# Patient Record
Sex: FEMALE | Race: WHITE | NOT HISPANIC OR LATINO | URBAN - METROPOLITAN AREA
[De-identification: names, ages, dates, MRNs, and addresses within clinical notes are randomized per-mention and may not be internally consistent; named-entity substitution may affect disease eponyms.]

---

## 2018-05-27 ENCOUNTER — EMERGENCY (EMERGENCY)
Facility: HOSPITAL | Age: 53
LOS: 0 days | Discharge: HOME | End: 2018-05-28
Payer: COMMERCIAL

## 2018-05-27 PROCEDURE — 74177 CT ABD & PELVIS W/CONTRAST: CPT | Mod: 26

## 2018-05-27 PROCEDURE — 99285 EMERGENCY DEPT VISIT HI MDM: CPT

## 2018-05-28 PROCEDURE — 74018 RADEX ABDOMEN 1 VIEW: CPT | Mod: 26

## 2018-08-10 ENCOUNTER — APPOINTMENT (OUTPATIENT)
Dept: OBGYN | Facility: CLINIC | Age: 53
End: 2018-08-10
Payer: COMMERCIAL

## 2018-08-10 VITALS
SYSTOLIC BLOOD PRESSURE: 128 MMHG | DIASTOLIC BLOOD PRESSURE: 64 MMHG | BODY MASS INDEX: 36.32 KG/M2 | WEIGHT: 226 LBS | HEIGHT: 66 IN

## 2018-08-10 DIAGNOSIS — Z00.00 ENCOUNTER FOR GENERAL ADULT MEDICAL EXAMINATION W/OUT ABNORMAL FINDINGS: ICD-10-CM

## 2018-08-10 PROCEDURE — 99396 PREV VISIT EST AGE 40-64: CPT

## 2018-08-13 LAB — HPV HIGH+LOW RISK DNA PNL CVX: NOT DETECTED

## 2018-08-15 LAB — CYTOLOGY CVX/VAG DOC THIN PREP: NORMAL

## 2018-08-21 ENCOUNTER — OUTPATIENT (OUTPATIENT)
Dept: OUTPATIENT SERVICES | Facility: HOSPITAL | Age: 53
LOS: 1 days | End: 2018-08-21

## 2018-09-04 ENCOUNTER — OTHER (OUTPATIENT)
Age: 53
End: 2018-09-04

## 2018-10-05 DIAGNOSIS — N63.0 UNSPECIFIED LUMP IN UNSPECIFIED BREAST: ICD-10-CM

## 2019-03-05 PROBLEM — N63.0 BREAST LUMP: Status: ACTIVE | Noted: 2019-03-05

## 2019-03-26 ENCOUNTER — RESULT REVIEW (OUTPATIENT)
Age: 54
End: 2019-03-26

## 2019-08-05 ENCOUNTER — TRANSCRIPTION ENCOUNTER (OUTPATIENT)
Age: 54
End: 2019-08-05

## 2019-08-12 ENCOUNTER — APPOINTMENT (OUTPATIENT)
Dept: OBGYN | Facility: CLINIC | Age: 54
End: 2019-08-12

## 2019-11-15 ENCOUNTER — APPOINTMENT (OUTPATIENT)
Dept: OBGYN | Facility: CLINIC | Age: 54
End: 2019-11-15

## 2020-01-10 ENCOUNTER — APPOINTMENT (OUTPATIENT)
Dept: ULTRASOUND IMAGING | Facility: CLINIC | Age: 55
End: 2020-01-10
Payer: COMMERCIAL

## 2020-01-10 ENCOUNTER — APPOINTMENT (OUTPATIENT)
Dept: MAMMOGRAPHY | Facility: CLINIC | Age: 55
End: 2020-01-10

## 2020-01-10 PROCEDURE — 76641 ULTRASOUND BREAST COMPLETE: CPT | Mod: 50

## 2020-01-10 PROCEDURE — 77066 DX MAMMO INCL CAD BI: CPT

## 2020-01-10 PROCEDURE — G0279: CPT

## 2020-01-17 ENCOUNTER — APPOINTMENT (OUTPATIENT)
Dept: OBGYN | Facility: CLINIC | Age: 55
End: 2020-01-17
Payer: COMMERCIAL

## 2020-01-17 VITALS
SYSTOLIC BLOOD PRESSURE: 130 MMHG | BODY MASS INDEX: 37.98 KG/M2 | WEIGHT: 242 LBS | HEIGHT: 67 IN | DIASTOLIC BLOOD PRESSURE: 72 MMHG

## 2020-01-17 DIAGNOSIS — Z01.411 ENCOUNTER FOR GYNECOLOGICAL EXAMINATION (GENERAL) (ROUTINE) WITH ABNORMAL FINDINGS: ICD-10-CM

## 2020-01-17 PROCEDURE — 99396 PREV VISIT EST AGE 40-64: CPT

## 2020-01-17 PROCEDURE — 99213 OFFICE O/P EST LOW 20 MIN: CPT | Mod: 25

## 2020-01-17 NOTE — PHYSICAL EXAM
[Awake] : awake [Alert] : alert [Soft] : soft [Oriented x3] : oriented to person, place, and time [Normal] : vagina [No Bleeding] : there was no active vaginal bleeding [Polyp ___ cm] : had a [unfilled] ~Ucm polyp [Uterine Adnexae] : were not tender and not enlarged [Acute Distress] : no acute distress [Goiter] : no goiter [LAD] : no lymphadenopathy [Thyroid Nodule] : no thyroid nodule [Mass] : no breast mass [Nipple Discharge] : no nipple discharge [Axillary LAD] : no axillary lymphadenopathy [Tender] : non tender [FreeTextEntry7] : unable to palpate gyn organs 2/2 body habitus

## 2020-01-18 ENCOUNTER — TRANSCRIPTION ENCOUNTER (OUTPATIENT)
Age: 55
End: 2020-01-18

## 2020-01-21 LAB — HPV HIGH+LOW RISK DNA PNL CVX: NOT DETECTED

## 2020-01-24 ENCOUNTER — ASOB RESULT (OUTPATIENT)
Age: 55
End: 2020-01-24

## 2020-01-24 ENCOUNTER — APPOINTMENT (OUTPATIENT)
Dept: OBGYN | Facility: CLINIC | Age: 55
End: 2020-01-24
Payer: COMMERCIAL

## 2020-01-24 LAB — CYTOLOGY CVX/VAG DOC THIN PREP: ABNORMAL

## 2020-01-24 PROCEDURE — 76830 TRANSVAGINAL US NON-OB: CPT

## 2020-01-24 PROCEDURE — 76856 US EXAM PELVIC COMPLETE: CPT | Mod: 59

## 2020-01-30 ENCOUNTER — RESULT REVIEW (OUTPATIENT)
Age: 55
End: 2020-01-30

## 2020-03-30 ENCOUNTER — APPOINTMENT (OUTPATIENT)
Dept: OBGYN | Facility: CLINIC | Age: 55
End: 2020-03-30
Payer: COMMERCIAL

## 2020-03-30 VITALS
HEIGHT: 67 IN | WEIGHT: 242 LBS | DIASTOLIC BLOOD PRESSURE: 80 MMHG | BODY MASS INDEX: 37.98 KG/M2 | SYSTOLIC BLOOD PRESSURE: 122 MMHG

## 2020-03-30 DIAGNOSIS — N84.0 POLYP OF CORPUS UTERI: ICD-10-CM

## 2020-03-30 DIAGNOSIS — N84.1 POLYP OF CERVIX UTERI: ICD-10-CM

## 2020-03-30 PROCEDURE — 99213 OFFICE O/P EST LOW 20 MIN: CPT

## 2020-03-31 ENCOUNTER — APPOINTMENT (OUTPATIENT)
Dept: OBGYN | Facility: CLINIC | Age: 55
End: 2020-03-31
Payer: COMMERCIAL

## 2020-03-31 ENCOUNTER — ASOB RESULT (OUTPATIENT)
Age: 55
End: 2020-03-31

## 2020-03-31 VITALS
SYSTOLIC BLOOD PRESSURE: 120 MMHG | HEIGHT: 67 IN | WEIGHT: 242 LBS | BODY MASS INDEX: 37.98 KG/M2 | DIASTOLIC BLOOD PRESSURE: 78 MMHG

## 2020-03-31 PROCEDURE — 58340 CATHETER FOR HYSTEROGRAPHY: CPT

## 2020-03-31 PROCEDURE — 76831 ECHO EXAM UTERUS: CPT

## 2020-03-31 PROCEDURE — 57500 BIOPSY OF CERVIX: CPT | Mod: 59

## 2020-04-04 LAB — CORE LAB BIOPSY: NORMAL

## 2021-01-21 ENCOUNTER — APPOINTMENT (OUTPATIENT)
Dept: OBGYN | Facility: CLINIC | Age: 56
End: 2021-01-21
Payer: COMMERCIAL

## 2021-01-21 VITALS — SYSTOLIC BLOOD PRESSURE: 128 MMHG | HEIGHT: 67 IN | DIASTOLIC BLOOD PRESSURE: 80 MMHG

## 2021-01-21 PROCEDURE — 99396 PREV VISIT EST AGE 40-64: CPT

## 2021-01-21 PROCEDURE — 99072 ADDL SUPL MATRL&STAF TM PHE: CPT

## 2021-01-21 NOTE — PHYSICAL EXAM
[Appropriately responsive] : appropriately responsive [Alert] : alert [No Acute Distress] : no acute distress [No Lymphadenopathy] : no lymphadenopathy [Regular Rate Rhythm] : regular rate rhythm [No Murmurs] : no murmurs [Clear to Auscultation B/L] : clear to auscultation bilaterally [Soft] : soft [Non-tender] : non-tender [Non-distended] : non-distended [No Mass] : no mass [Oriented x3] : oriented x3 [FreeTextEntry6] : no dominant masses   no skin dimpling   no nipple discharge [Examination Of The Breasts] : a normal appearance [No Discharge] : no discharge [No Masses] : no breast masses were palpable [No Lesions] : no lesions  [Labia Majora] : normal [Labia Minora] : normal [Pink Rugae] : pink rugae [No Bleeding] : There was no active vaginal bleeding [Normal] : normal [Normal Position] : in a normal position [Tenderness] : nontender [Declined] : Patient declined rectal exam [Uterine Adnexae] : normal

## 2021-01-22 LAB
C TRACH RRNA SPEC QL NAA+PROBE: NOT DETECTED
N GONORRHOEA RRNA SPEC QL NAA+PROBE: NOT DETECTED
SOURCE TP AMPLIFICATION: NORMAL

## 2021-01-23 LAB — HPV HIGH+LOW RISK DNA PNL CVX: NOT DETECTED

## 2021-01-27 DIAGNOSIS — B96.89 ACUTE VAGINITIS: ICD-10-CM

## 2021-01-27 DIAGNOSIS — N76.0 ACUTE VAGINITIS: ICD-10-CM

## 2021-01-27 LAB — CYTOLOGY CVX/VAG DOC THIN PREP: ABNORMAL

## 2021-01-28 ENCOUNTER — NON-APPOINTMENT (OUTPATIENT)
Age: 56
End: 2021-01-28

## 2021-02-08 ENCOUNTER — OUTPATIENT (OUTPATIENT)
Dept: OUTPATIENT SERVICES | Facility: HOSPITAL | Age: 56
LOS: 1 days | End: 2021-02-08

## 2021-03-08 ENCOUNTER — OUTPATIENT (OUTPATIENT)
Dept: OUTPATIENT SERVICES | Facility: HOSPITAL | Age: 56
LOS: 1 days | End: 2021-03-08

## 2021-04-17 ENCOUNTER — RESULT REVIEW (OUTPATIENT)
Age: 56
End: 2021-04-17

## 2021-04-17 ENCOUNTER — APPOINTMENT (OUTPATIENT)
Dept: MAMMOGRAPHY | Facility: CLINIC | Age: 56
End: 2021-04-17
Payer: COMMERCIAL

## 2021-04-17 PROCEDURE — 77063 BREAST TOMOSYNTHESIS BI: CPT

## 2021-04-17 PROCEDURE — 77067 SCR MAMMO BI INCL CAD: CPT

## 2021-06-29 ENCOUNTER — APPOINTMENT (OUTPATIENT)
Dept: ULTRASOUND IMAGING | Facility: CLINIC | Age: 56
End: 2021-06-29
Payer: COMMERCIAL

## 2021-06-29 ENCOUNTER — APPOINTMENT (OUTPATIENT)
Dept: RADIOLOGY | Facility: CLINIC | Age: 56
End: 2021-06-29
Payer: COMMERCIAL

## 2021-06-29 PROCEDURE — 76536 US EXAM OF HEAD AND NECK: CPT

## 2021-06-29 PROCEDURE — 72114 X-RAY EXAM L-S SPINE BENDING: CPT

## 2022-02-24 ENCOUNTER — OUTPATIENT (OUTPATIENT)
Dept: OUTPATIENT SERVICES | Facility: HOSPITAL | Age: 57
LOS: 1 days | End: 2022-02-24

## 2022-02-24 DIAGNOSIS — E03.9 HYPOTHYROIDISM, UNSPECIFIED: ICD-10-CM

## 2022-02-24 DIAGNOSIS — Z83.3 FAMILY HISTORY OF DIABETES MELLITUS: ICD-10-CM

## 2022-02-24 DIAGNOSIS — R31.9 HEMATURIA, UNSPECIFIED: ICD-10-CM

## 2022-03-05 ENCOUNTER — APPOINTMENT (OUTPATIENT)
Dept: ULTRASOUND IMAGING | Facility: CLINIC | Age: 57
End: 2022-03-05
Payer: COMMERCIAL

## 2022-03-05 PROCEDURE — 76770 US EXAM ABDO BACK WALL COMP: CPT

## 2022-03-14 ENCOUNTER — RESULT CHARGE (OUTPATIENT)
Age: 57
End: 2022-03-14

## 2022-03-14 ENCOUNTER — APPOINTMENT (OUTPATIENT)
Dept: UROGYNECOLOGY | Facility: CLINIC | Age: 57
End: 2022-03-14
Payer: COMMERCIAL

## 2022-03-14 VITALS
SYSTOLIC BLOOD PRESSURE: 132 MMHG | HEIGHT: 67 IN | BODY MASS INDEX: 24.33 KG/M2 | WEIGHT: 155 LBS | DIASTOLIC BLOOD PRESSURE: 78 MMHG

## 2022-03-14 DIAGNOSIS — Z87.19 PERSONAL HISTORY OF OTHER DISEASES OF THE DIGESTIVE SYSTEM: ICD-10-CM

## 2022-03-14 DIAGNOSIS — N39.46 MIXED INCONTINENCE: ICD-10-CM

## 2022-03-14 DIAGNOSIS — N32.81 OVERACTIVE BLADDER: ICD-10-CM

## 2022-03-14 DIAGNOSIS — Z86.39 PERSONAL HISTORY OF OTHER ENDOCRINE, NUTRITIONAL AND METABOLIC DISEASE: ICD-10-CM

## 2022-03-14 DIAGNOSIS — R10.30 LOWER ABDOMINAL PAIN, UNSPECIFIED: ICD-10-CM

## 2022-03-14 DIAGNOSIS — Z87.09 PERSONAL HISTORY OF OTHER DISEASES OF THE RESPIRATORY SYSTEM: ICD-10-CM

## 2022-03-14 DIAGNOSIS — E07.9 DISORDER OF THYROID, UNSPECIFIED: ICD-10-CM

## 2022-03-14 LAB
BILIRUB UR QL STRIP: NEGATIVE
CLARITY UR: CLEAR
COLLECTION METHOD: NORMAL
GLUCOSE UR-MCNC: NEGATIVE
HCG UR QL: 0.2 EU/DL
HGB UR QL STRIP.AUTO: NEGATIVE
KETONES UR-MCNC: NEGATIVE
LEUKOCYTE ESTERASE UR QL STRIP: NORMAL
NITRITE UR QL STRIP: NEGATIVE
PH UR STRIP: 7
PROT UR STRIP-MCNC: NEGATIVE
SP GR UR STRIP: 1.01

## 2022-03-14 PROCEDURE — 99204 OFFICE O/P NEW MOD 45 MIN: CPT

## 2022-03-14 PROCEDURE — 99244 OFF/OP CNSLTJ NEW/EST MOD 40: CPT

## 2022-03-14 NOTE — DISCUSSION/SUMMARY
[FreeTextEntry1] : Patient is a 56 -year-old multipara with class III obesity and nocturia, now with mixed urinary incontinence (urgency greater than stress), and overactive bladder. On examination, there is no evidence of urethral hypermobility with a negative empty bladder supine cough stress test, normal postvoid residual, and fair levator ani awareness/contraction. She has no history of recurrent UTI. She admits to consumption of lfew of bladder irritants. Other potential contributing factors include high BMI, \par \par The patient was counseled regarding the pathophysiology of the above conditions. A total of approximately 60 minutes was spent on this visit, greater than 50%of which was spent on counseling. She was also counseled regarding the risks, benefits, indications, and alternatives of further evaluations studies, as well as various management options. She was given verbal and written information/education on pelvic floor muscle exercises, avoidance of dietary bladder irritants, and other strategies to improve bladder control. She was counseled regarding treatment options for stress urinary incontinence including pelvic floor PT, pessary placement and surgical management with midurethral sling. AUGS interactive tool was used to explain normal anatomy as well as alteration in urethral support associated with stress urinary incontinence. Midurethral sling placement as well as urethral bulking was discussed. Pharmacologic management of overactive bladder and urgency incontinence was discussed. After a detailed discussion, following management plan was outlined:\par 1.8 weeks trial of behavioral modification, bladder retraining . she will start the home exercise program as instructed.\par 2. UA with micro and urine culture was ordered to exclude UTI.\par 3. Night time fluid restriction and other strategies to decrease nocturia were reviewed.\par 4.  urodynamic testing. . \par 5. F/u in 2 months

## 2022-03-14 NOTE — HISTORY OF PRESENT ILLNESS
[FreeTextEntry1] : Patient is a 56 years old P2 (C-sectiion x 2) who is referred by Dr. Jaramillo for evaluation and management of urinary leakage with cough or laugh. Happen 0- once a month on average.\par  Also has to rush to the bathroom sometimes. She is a . \par Voids when she has restroom available even though she didn't have to due to nature of the job\par No nocturia\par Doesn't use protection for bladder but wears a sanitary pull up because "i sweat"\par Drinks 1 cup of coffee in am (8-12 oz), water 60 oz per day. \par Has gained 10 pounds over the last 1 year. Hx of lap band in 2011\par Pulling sensation in groin (left >right). Has chronic back pain\par No hx of dyspareunia but last time "it was weird"/\par LMP: June 2021. Last GYN  exam in Jan 2021, used to see Dr. Persaud\par No hx of diabetes\par Renal ultrasound from 3/5/22 was negative (was ordered for work up of pain)\par

## 2022-03-14 NOTE — PHYSICAL EXAM
[Chaperone Present] : A chaperone was present in the examining room during all aspects of the physical examination [FreeTextEntry1] : General: Not in acute distress, alert and oriented x3.\par Neck: Supple. No lymphadenopathy. \par Skin: Color, texture, and turgor are normal. No acute rashes or lesions\par Abdomen: Soft, nontender, and nondistended. No obvious hepatosplenomegaly. No obvious hernias. A well-healed  skin incision. No tenderness elicited. \par Pelvic Exam: Normal external female genitalia. Saddle sensory exam S2 to S4 is intact. Perineal reflexes not visualized. Pale and mildly atrophic-appearing vaginal epithelium. No vaginal blood or discharge. Cervix without abnormal lesions. Urethra is well supported without prolapse, exudates, or lesions. Cough stress test is negative. Post void residual was checked with I/O cath and was 10 cc of clear urine. Bimanual exam reveals a small uterus in normal positioning. No adnexal masses or tenderness but exam limited by her habitus.. All vaginal compartments are well supported. Levator ani contraction is 2/5. Rectovaginal exam was deferred. \par

## 2022-03-15 LAB
APPEARANCE: CLEAR
BACTERIA: NEGATIVE
BILIRUBIN URINE: NEGATIVE
BLOOD URINE: NEGATIVE
COLOR: YELLOW
GLUCOSE QUALITATIVE U: NEGATIVE
HYALINE CASTS: 0 /LPF
KETONES URINE: NEGATIVE
LEUKOCYTE ESTERASE URINE: NEGATIVE
MICROSCOPIC-UA: NORMAL
NITRITE URINE: POSITIVE
PH URINE: 8
PROTEIN URINE: ABNORMAL
RED BLOOD CELLS URINE: 1 /HPF
SPECIFIC GRAVITY URINE: 1.02
SQUAMOUS EPITHELIAL CELLS: 0 /HPF
URINE COMMENTS: NORMAL
UROBILINOGEN URINE: NORMAL
WHITE BLOOD CELLS URINE: 1 /HPF

## 2022-03-16 LAB — BACTERIA UR CULT: NORMAL

## 2022-04-01 ENCOUNTER — NON-APPOINTMENT (OUTPATIENT)
Age: 57
End: 2022-04-01

## 2022-04-01 ENCOUNTER — APPOINTMENT (OUTPATIENT)
Dept: CARDIOLOGY | Facility: CLINIC | Age: 57
End: 2022-04-01
Payer: COMMERCIAL

## 2022-04-01 VITALS
DIASTOLIC BLOOD PRESSURE: 70 MMHG | TEMPERATURE: 96.9 F | BODY MASS INDEX: 39.24 KG/M2 | OXYGEN SATURATION: 98 % | HEIGHT: 67 IN | WEIGHT: 250 LBS | HEART RATE: 68 BPM | SYSTOLIC BLOOD PRESSURE: 120 MMHG

## 2022-04-01 VITALS — SYSTOLIC BLOOD PRESSURE: 124 MMHG | DIASTOLIC BLOOD PRESSURE: 78 MMHG

## 2022-04-01 DIAGNOSIS — Z82.49 FAMILY HISTORY OF ISCHEMIC HEART DISEASE AND OTHER DISEASES OF THE CIRCULATORY SYSTEM: ICD-10-CM

## 2022-04-01 DIAGNOSIS — Z83.3 FAMILY HISTORY OF DIABETES MELLITUS: ICD-10-CM

## 2022-04-01 PROCEDURE — 99204 OFFICE O/P NEW MOD 45 MIN: CPT

## 2022-04-01 RX ORDER — METRONIDAZOLE 7.5 MG/G
0.75 GEL VAGINAL
Qty: 70 | Refills: 1 | Status: DISCONTINUED | COMMUNITY
Start: 2021-01-27 | End: 2022-04-01

## 2022-04-01 NOTE — REASON FOR VISIT
[FreeTextEntry1] : Minnie is a 56-year-old female with history of hypothyroidism, overweight, prediabetes, labile blood pressures, obstructive sleep apnea and Conklin's esophagitis.\par \par She is very active and does not have exertional chest pressure symptoms but she does have occasional epigastric discomfort which is difficult to differentiate from GERD symptoms.  She does not have PND, orthopnea, pedal edema, TIA, syncope\par \par She has sleep apnea and uses CPAP machine which is helping her significantly.\par \par Hyperlipidemia was noted on recent labs.  February 2022, fasting LDL was 176.  She also has prediabetes with A1c of 5.9

## 2022-04-01 NOTE — DISCUSSION/SUMMARY
[FreeTextEntry1] : Patient with several coronary risk factors and atypical epigastric discomfort.  CT calcium score and ETT should be performed.  Patient was scheduled for this tests.\par \par Hypercholesteremia.  She has been started on 5 mg of rosuvastatin.  Target LDL less than 100.  Follow-up lipid labs are pending.  Depending upon that LDL, the dose of rosuvastatin may have to be increased.  Strict dietary changes were also discussed with reduction of saturated fat intake\par \par Overweight: Exercise program and dietary changes were both recommended the.\par \par Current risk factors including family history, prediabetes, overweight, hypercholesterolemia, labile blood pressures: Patient should have ETT to provide useful information regarding blood pressure response as well as exercise capacity besides ruling out CAD\par \par Sleep apnea: Continue CPAP machine\par \par Prediabetes: Dietary changes, weight loss and exercise was again recommended.  Target A1c less than 5.7\par \par Follow-up after above tests has been scheduled\par \par Thank you for this referral and allowing me to participate in the care of this patient.  If I can be of any further help or  if you have any questions, please do not hesitate to contact me\par \par \par Sincerely,\par \par Willian Cowan MD, FACC, MILLICENT

## 2022-04-01 NOTE — PHYSICAL EXAM
[Well Developed] : well developed [Well Nourished] : well nourished [No Acute Distress] : no acute distress [Normal Conjunctiva] : normal conjunctiva [Normal Venous Pressure] : normal venous pressure [No Carotid Bruit] : no carotid bruit [Normal S1, S2] : normal S1, S2 [No Murmur] : no murmur [No Rub] : no rub [No Gallop] : no gallop [Clear Lung Fields] : clear lung fields [Good Air Entry] : good air entry [No Respiratory Distress] : no respiratory distress  [Soft] : abdomen soft [Normal Gait] : normal gait [No Edema] : no edema [No Cyanosis] : no cyanosis [No Clubbing] : no clubbing [No Varicosities] : no varicosities [No Rash] : no rash [No Skin Lesions] : no skin lesions [Moves all extremities] : moves all extremities [No Focal Deficits] : no focal deficits [Normal Speech] : normal speech [Alert and Oriented] : alert and oriented [Normal memory] : normal memory [de-identified] : Obesity

## 2022-04-26 ENCOUNTER — APPOINTMENT (OUTPATIENT)
Dept: CARDIOLOGY | Facility: CLINIC | Age: 57
End: 2022-04-26
Payer: COMMERCIAL

## 2022-04-26 PROCEDURE — 93306 TTE W/DOPPLER COMPLETE: CPT

## 2022-05-07 ENCOUNTER — APPOINTMENT (OUTPATIENT)
Dept: MAMMOGRAPHY | Facility: CLINIC | Age: 57
End: 2022-05-07
Payer: COMMERCIAL

## 2022-05-07 PROCEDURE — 77067 SCR MAMMO BI INCL CAD: CPT

## 2022-05-07 PROCEDURE — 77063 BREAST TOMOSYNTHESIS BI: CPT

## 2022-05-09 ENCOUNTER — APPOINTMENT (OUTPATIENT)
Dept: CARDIOLOGY | Facility: CLINIC | Age: 57
End: 2022-05-09
Payer: COMMERCIAL

## 2022-05-09 PROCEDURE — 93015 CV STRESS TEST SUPVJ I&R: CPT

## 2022-05-16 ENCOUNTER — APPOINTMENT (OUTPATIENT)
Dept: CARDIOLOGY | Facility: CLINIC | Age: 57
End: 2022-05-16

## 2022-06-01 ENCOUNTER — APPOINTMENT (OUTPATIENT)
Dept: CARDIOLOGY | Facility: CLINIC | Age: 57
End: 2022-06-01
Payer: COMMERCIAL

## 2022-06-01 PROCEDURE — 93351 STRESS TTE COMPLETE: CPT

## 2022-06-01 PROCEDURE — 93320 DOPPLER ECHO COMPLETE: CPT

## 2022-06-07 ENCOUNTER — APPOINTMENT (OUTPATIENT)
Dept: CARDIOLOGY | Facility: CLINIC | Age: 57
End: 2022-06-07
Payer: COMMERCIAL

## 2022-06-07 VITALS
OXYGEN SATURATION: 98 % | SYSTOLIC BLOOD PRESSURE: 116 MMHG | TEMPERATURE: 96.8 F | HEART RATE: 63 BPM | DIASTOLIC BLOOD PRESSURE: 68 MMHG | RESPIRATION RATE: 12 BRPM | WEIGHT: 248 LBS | BODY MASS INDEX: 38.92 KG/M2 | HEIGHT: 67 IN

## 2022-06-07 DIAGNOSIS — R03.0 ELEVATED BLOOD-PRESSURE READING, W/OUT DIAGNOSIS OF HYPERTENSION: ICD-10-CM

## 2022-06-07 PROCEDURE — 99213 OFFICE O/P EST LOW 20 MIN: CPT

## 2022-06-07 RX ORDER — LANSOPRAZOLE 15 MG/1
15 CAPSULE, DELAYED RELEASE ORAL DAILY
Refills: 0 | Status: ACTIVE | COMMUNITY

## 2022-06-07 RX ORDER — DOXYCYCLINE HYCLATE 100 MG/1
100 TABLET ORAL
Qty: 10 | Refills: 0 | Status: DISCONTINUED | COMMUNITY
Start: 2022-03-15

## 2022-06-07 RX ORDER — LEVOTHYROXINE SODIUM 88 MCG
88 TABLET ORAL DAILY
Refills: 0 | Status: ACTIVE | COMMUNITY

## 2022-06-07 NOTE — PHYSICAL EXAM
[Well Developed] : well developed [Well Nourished] : well nourished [No Acute Distress] : no acute distress [Normal Venous Pressure] : normal venous pressure [No Carotid Bruit] : no carotid bruit [Normal S1, S2] : normal S1, S2 [No Murmur] : no murmur [No Rub] : no rub [No Gallop] : no gallop [Clear Lung Fields] : clear lung fields [Good Air Entry] : good air entry [No Respiratory Distress] : no respiratory distress  [Normal Gait] : normal gait [No Edema] : no edema [No Rash] : no rash [Moves all extremities] : moves all extremities [Normal Speech] : normal speech [Alert and Oriented] : alert and oriented [de-identified] : obese

## 2022-06-07 NOTE — DISCUSSION/SUMMARY
[FreeTextEntry1] : LUPE WANG is a 56 year old F who presents today Jun 07, 2022 with the above history and the following active issues: \par \par Stress Echo reported above and Grossly normal with no evidence of ischemia.\par \par Hypercholesteremia.  She has been started on 5 mg of rosuvastatin.  Target LDL less than 100.  Follow-up lipid labs are pending for the end of June with her PCP.  Depending upon that LDL, the dose of rosuvastatin may have to be increased.  Strict dietary changes were also discussed with reduction of saturated fat intake\par Ct Aquilino score 31- incidental findings on CT discussed with copy given to discuss with her PCP. \par \par Overweight: Exercise program and dietary changes were both recommended the.\par Dietary and lifestyle changes to reduce weight including exercise program, reduction of total calories and reduction of carbs was discussed. \par \par Current risk factors including family history, prediabetes, overweight, hypercholesterolemia, labile blood pressures: \par \par Sleep apnea: Continue CPAP machine\par \par Prediabetes: Dietary changes, weight loss and exercise was again recommended.  Target A1c less than 5.7\par \par Sincerely,\par \par Ashley Peres ANP-C\par Patients history, testing, and plan reviewed with supervising MD: Dr. Jeff Samuel

## 2022-06-07 NOTE — REASON FOR VISIT
[FreeTextEntry1] : Minnie is a 56-year-old female with history of hypothyroidism, overweight, prediabetes, labile blood pressures, obstructive sleep apnea and Conklin's esophagitis.\par \par Last visit was 4/2022. Today she presents to follow up to review her recent testing. In the interim there have not been any hospitalizations or procedures. She denies chest pain, pressure, palpitations, unusual shortness of breath, DRISCOLL, orthopnea, LE edema, lightheadedness, dizziness, near syncope or syncope. She is not on a formal exercise routine. Weight has remained stable at  248lbs.\par \par She has sleep apnea and uses CPAP machine which is helping her significantly.\par \par Hyperlipidemia was noted on recent labs.  February 2022, fasting LDL was 176. She was started on Crestor by her PCP. She is due for repeat labs at the end of this month. She also has prediabetes with A1c of 5.9

## 2022-06-07 NOTE — CARDIOLOGY SUMMARY
[de-identified] : ETT 5-9/2022: Exercise 6:06, 7 METS Equivocal for ischemia\par Stress Echo: 6/1/2022 Exercise 6:38 8METs average for age and gender. Equivocal EKG response. Grossly normal stress echo with no evidence of ischemia. LVEF 60-65%  [de-identified] : CT calcium Score= 31. LAD 31.  [de-identified] : Labs: TSH 1.150 Ast 27 Alt 34 Creat 0.9 A1C 5.9% Total Chol 274  Trig 110 Hgb 14.3 Hct 43.3 Plt 231

## 2023-03-17 ENCOUNTER — APPOINTMENT (OUTPATIENT)
Dept: OBGYN | Facility: CLINIC | Age: 58
End: 2023-03-17
Payer: COMMERCIAL

## 2023-03-17 VITALS
SYSTOLIC BLOOD PRESSURE: 138 MMHG | BODY MASS INDEX: 39.71 KG/M2 | HEIGHT: 67 IN | WEIGHT: 253 LBS | DIASTOLIC BLOOD PRESSURE: 79 MMHG

## 2023-03-17 DIAGNOSIS — Z01.419 ENCOUNTER FOR GYNECOLOGICAL EXAMINATION (GENERAL) (ROUTINE) W/OUT ABNORMAL FINDINGS: ICD-10-CM

## 2023-03-17 DIAGNOSIS — Z12.39 ENCOUNTER FOR OTHER SCREENING FOR MALIGNANT NEOPLASM OF BREAST: ICD-10-CM

## 2023-03-17 DIAGNOSIS — Z12.4 ENCOUNTER FOR SCREENING FOR MALIGNANT NEOPLASM OF CERVIX: ICD-10-CM

## 2023-03-17 PROCEDURE — 99396 PREV VISIT EST AGE 40-64: CPT

## 2023-03-17 NOTE — PLAN
[FreeTextEntry1] : thin prep with hr hpv\par scheduled Mammogram 5/7/23   patient already has an order\par follow up with GI as scheduled\par \par follow up gyn as needed\par \par patient decreased calcium supplements to 600 mg daily; down from 1200 mg daily due to elevated calcium in serum

## 2023-03-17 NOTE — PHYSICAL EXAM
[Appropriately responsive] : appropriately responsive [Alert] : alert [No Acute Distress] : no acute distress [No Lymphadenopathy] : no lymphadenopathy [Regular Rate Rhythm] : regular rate rhythm [No Murmurs] : no murmurs [Clear to Auscultation B/L] : clear to auscultation bilaterally [Soft] : soft [Non-tender] : non-tender [Non-distended] : non-distended [Oriented x3] : oriented x3 [FreeTextEntry6] : no dominant masses   no skin dimpling   no nipple d/c [FreeTextEntry7] : port pump palpable [Examination Of The Breasts] : a normal appearance [No Discharge] : no discharge [No Masses] : no breast masses were palpable [Labia Majora] : normal [Labia Minora] : normal [Pink Rugae] : pink rugae [No Bleeding] : There was no active vaginal bleeding [Normal] : normal [Normal Position] : in a normal position [Tenderness] : nontender [Enlarged ___ wks] : not enlarged [Mass ___ cm] : no uterine mass was palpated [Uterine Adnexae] : normal [Declined] : Patient declined rectal exam

## 2023-03-19 LAB — HPV HIGH+LOW RISK DNA PNL CVX: NOT DETECTED

## 2023-03-23 LAB — CYTOLOGY CVX/VAG DOC THIN PREP: NORMAL

## 2023-05-08 ENCOUNTER — APPOINTMENT (OUTPATIENT)
Dept: MAMMOGRAPHY | Facility: CLINIC | Age: 58
End: 2023-05-08
Payer: COMMERCIAL

## 2023-05-08 PROCEDURE — 77063 BREAST TOMOSYNTHESIS BI: CPT

## 2023-05-08 PROCEDURE — 77067 SCR MAMMO BI INCL CAD: CPT

## 2023-05-23 ENCOUNTER — APPOINTMENT (OUTPATIENT)
Dept: CARDIOLOGY | Facility: CLINIC | Age: 58
End: 2023-05-23
Payer: COMMERCIAL

## 2023-05-23 ENCOUNTER — NON-APPOINTMENT (OUTPATIENT)
Age: 58
End: 2023-05-23

## 2023-05-23 VITALS
WEIGHT: 246 LBS | SYSTOLIC BLOOD PRESSURE: 114 MMHG | BODY MASS INDEX: 38.61 KG/M2 | DIASTOLIC BLOOD PRESSURE: 78 MMHG | RESPIRATION RATE: 12 BRPM | HEART RATE: 69 BPM | HEIGHT: 67 IN | OXYGEN SATURATION: 96 %

## 2023-05-23 PROCEDURE — 99214 OFFICE O/P EST MOD 30 MIN: CPT | Mod: 25

## 2023-05-23 PROCEDURE — 93000 ELECTROCARDIOGRAM COMPLETE: CPT

## 2023-05-23 RX ORDER — CALCIUM CARB/VIT D2/MINERALS
TABLET ORAL DAILY
Refills: 0 | Status: DISCONTINUED | COMMUNITY
End: 2023-05-23

## 2023-05-23 NOTE — PHYSICAL EXAM
[Well Developed] : well developed [Well Nourished] : well nourished [No Acute Distress] : no acute distress [Normal Venous Pressure] : normal venous pressure [No Carotid Bruit] : no carotid bruit [Normal S1, S2] : normal S1, S2 [No Murmur] : no murmur [No Rub] : no rub [No Gallop] : no gallop [Clear Lung Fields] : clear lung fields [Good Air Entry] : good air entry [No Respiratory Distress] : no respiratory distress  [Normal Gait] : normal gait [No Edema] : no edema [No Rash] : no rash [Moves all extremities] : moves all extremities [Normal Speech] : normal speech [Alert and Oriented] : alert and oriented [de-identified] : obese

## 2023-05-23 NOTE — CARDIOLOGY SUMMARY
[de-identified] : ETT 5-9/2022: Exercise 6:06, 7 METS Equivocal for ischemia\par Stress Echo: 6/1/2022 Exercise 6:38 8METs average for age and gender. Equivocal EKG response. Grossly normal stress echo with no evidence of ischemia. LVEF 60-65%  [de-identified] : CT calcium Score= 31. LAD 31.  [de-identified] : Labs: TSH 1.150 Ast 27 Alt 34 Creat 0.9 A1C 5.9% Total Chol 274  Trig 110 Hgb 14.3 Hct 43.3 Plt 231

## 2023-05-23 NOTE — REASON FOR VISIT
[FreeTextEntry1] : Minnie is a 57-year-old female with history of hypothyroidism, overweight, prediabetes, labile blood pressures, obstructive sleep apnea and Conklin's esophagitis.\par \par She denies chest pain, pressure, palpitations, unusual shortness of breath, DRISCOLL, orthopnea, LE edema, lightheadedness, dizziness, near syncope or syncope. She is not on a formal exercise routine. Weight has remained stable at 246 pounds\par \par She has sleep apnea and uses CPAP machine which is helping her significantly.\par \par Hyperlipidemia was noted on recent labs.  February 2022, fasting LDL was 176. She was started on Crestor by her PCP.  LDL in February 2023 was 127.  A1c 6.1.

## 2023-05-23 NOTE — DISCUSSION/SUMMARY
[FreeTextEntry1] : LUPE WANG is a 57 year old F \par \par Stress Echo reported above and Grossly normal with no evidence of ischemia.\par \par Hypercholesteremia.  She has been started on 5 mg of rosuvastatin.  Most recent LDL in February was 127.  Target LDL less than 80.   Strict dietary changes were also discussed with reduction of saturated fat intake\par \par Ct Aquilino score 31- incidental findings on CT discussed with copy given to discuss with her PCP. \par \par Overweight: Exercise program and dietary changes were both recommended the.\par Dietary and lifestyle changes to reduce weight including exercise program, reduction of total calories and reduction of carbs was discussed. \par \par Current risk factors including family history, prediabetes, overweight, hypercholesterolemia, labile blood pressures: \par \par Sleep apnea: Continue CPAP machine\par \par Prediabetes: Dietary changes, weight loss and exercise was again recommended.  Target A1c less than 5.7\par \par Thank you for this referral and allowing me to participate in the care of this patient.  If I can be of any further help or  if you have any questions, please do not hesitate to contact me\par \par \par Sincerely,\par \par Willian Cowan MD, FAC, MILLICENT

## 2023-08-11 ENCOUNTER — APPOINTMENT (OUTPATIENT)
Dept: RADIOLOGY | Facility: CLINIC | Age: 58
End: 2023-08-11
Payer: COMMERCIAL

## 2023-08-11 PROCEDURE — 77080 DXA BONE DENSITY AXIAL: CPT

## 2023-12-01 ENCOUNTER — RESULT REVIEW (OUTPATIENT)
Age: 58
End: 2023-12-01

## 2023-12-01 ENCOUNTER — APPOINTMENT (OUTPATIENT)
Dept: UROLOGY | Facility: HOSPITAL | Age: 58
End: 2023-12-01

## 2023-12-06 ENCOUNTER — APPOINTMENT (OUTPATIENT)
Dept: UROLOGY | Facility: CLINIC | Age: 58
End: 2023-12-06
Payer: COMMERCIAL

## 2023-12-06 VITALS
OXYGEN SATURATION: 98 % | SYSTOLIC BLOOD PRESSURE: 139 MMHG | DIASTOLIC BLOOD PRESSURE: 78 MMHG | BODY MASS INDEX: 38.61 KG/M2 | RESPIRATION RATE: 16 BRPM | HEIGHT: 67 IN | TEMPERATURE: 98 F | HEART RATE: 66 BPM | WEIGHT: 246 LBS

## 2023-12-06 PROCEDURE — 52310 CYSTOSCOPY AND TREATMENT: CPT

## 2024-03-18 ENCOUNTER — APPOINTMENT (OUTPATIENT)
Dept: CARDIOLOGY | Facility: CLINIC | Age: 59
End: 2024-03-18
Payer: COMMERCIAL

## 2024-03-18 VITALS
OXYGEN SATURATION: 98 % | SYSTOLIC BLOOD PRESSURE: 124 MMHG | DIASTOLIC BLOOD PRESSURE: 70 MMHG | HEIGHT: 67 IN | BODY MASS INDEX: 38.45 KG/M2 | HEART RATE: 76 BPM | RESPIRATION RATE: 14 BRPM | WEIGHT: 245 LBS

## 2024-03-18 DIAGNOSIS — G47.33 OBSTRUCTIVE SLEEP APNEA (ADULT) (PEDIATRIC): ICD-10-CM

## 2024-03-18 DIAGNOSIS — K22.70 BARRETT'S ESOPHAGUS W/OUT DYSPLASIA: ICD-10-CM

## 2024-03-18 DIAGNOSIS — R94.39 ABNORMAL RESULT OF OTHER CARDIOVASCULAR FUNCTION STUDY: ICD-10-CM

## 2024-03-18 DIAGNOSIS — R73.03 PREDIABETES.: ICD-10-CM

## 2024-03-18 DIAGNOSIS — E78.00 PURE HYPERCHOLESTEROLEMIA, UNSPECIFIED: ICD-10-CM

## 2024-03-18 DIAGNOSIS — N20.1 CALCULUS OF URETER: ICD-10-CM

## 2024-03-18 DIAGNOSIS — E03.9 HYPOTHYROIDISM, UNSPECIFIED: ICD-10-CM

## 2024-03-18 DIAGNOSIS — I25.10 ATHEROSCLEROTIC HEART DISEASE OF NATIVE CORONARY ARTERY W/OUT ANGINA PECTORIS: ICD-10-CM

## 2024-03-18 DIAGNOSIS — E66.9 OBESITY, UNSPECIFIED: ICD-10-CM

## 2024-03-18 PROCEDURE — 99214 OFFICE O/P EST MOD 30 MIN: CPT

## 2024-03-18 PROCEDURE — G2211 COMPLEX E/M VISIT ADD ON: CPT

## 2024-03-18 RX ORDER — GLUC/MSM/COLGN2/HYAL/ANTIARTH3 375-375-20
TABLET ORAL DAILY
Refills: 0 | Status: DISCONTINUED | COMMUNITY
End: 2024-03-18

## 2024-03-18 RX ORDER — CEPHALEXIN 500 MG/1
500 CAPSULE ORAL
Qty: 1 | Refills: 0 | Status: DISCONTINUED | COMMUNITY
Start: 2023-12-06 | End: 2024-03-18

## 2024-03-18 RX ORDER — CEPHALEXIN 500 MG/1
500 TABLET ORAL
Qty: 8 | Refills: 0 | Status: DISCONTINUED | COMMUNITY
Start: 2023-12-01 | End: 2024-03-18

## 2024-03-18 RX ORDER — PSYLLIUM HUSK 0.4 G
CAPSULE ORAL DAILY
Refills: 0 | Status: DISCONTINUED | COMMUNITY
End: 2024-03-18

## 2024-03-18 RX ORDER — MULTIVIT-MIN/IRON/FOLIC ACID/K 18-600-40
CAPSULE ORAL DAILY
Refills: 0 | Status: DISCONTINUED | COMMUNITY
End: 2024-03-18

## 2024-03-18 RX ORDER — TITANIUM DIOXIDE, OCTINOXATE, ZINC OXIDE 4.61; 1.6; .78 G/40ML; G/40ML; G/40ML
400 CREAM TOPICAL
Refills: 0 | Status: DISCONTINUED | COMMUNITY
End: 2024-03-18

## 2024-03-18 RX ORDER — OXYBUTYNIN CHLORIDE 5 MG/1
5 TABLET ORAL
Qty: 7 | Refills: 0 | Status: DISCONTINUED | COMMUNITY
Start: 2023-12-01 | End: 2024-03-18

## 2024-03-18 NOTE — CARDIOLOGY SUMMARY
[de-identified] : CT calcium Score= 31. LAD 31.  [de-identified] : ETT 5-9/2022: Exercise 6:06, 7 METS Equivocal for ischemia\par  Stress Echo: 6/1/2022 Exercise 6:38 8METs average for age and gender. Equivocal EKG response. Grossly normal stress echo with no evidence of ischemia. LVEF 60-65%  [de-identified] : Labs: TSH 1.150 Ast 27 Alt 34 Creat 0.9 A1C 5.9% Total Chol 274  Trig 110 Hgb 14.3 Hct 43.3 Plt 231

## 2024-03-18 NOTE — REASON FOR VISIT
[FreeTextEntry1] : Minnie is a 58-year-old female with history of hypothyroidism, overweight, prediabetes, labile blood pressures, obstructive sleep apnea and Conklin's esophagitis.  She denies chest pain, pressure, palpitations, unusual shortness of breath, DRISCOLL, orthopnea, LE edema, lightheadedness, dizziness, near syncope or syncope. She is not on a formal exercise routine. Weight has remained stable at 246 pounds  She has sleep apnea and uses CPAP machine which is helping her significantly.  Hyperlipidemia was noted.  February 2022, fasting LDL was 176. She was started on Crestor by her PCP.  LDL in February 2023 was 127.  A1c 6.1.  Her cholesterol meds were increased recently by Dr. Jaramillo after blood work.

## 2024-03-18 NOTE — PHYSICAL EXAM
[Well Developed] : well developed [Well Nourished] : well nourished [No Acute Distress] : no acute distress [Normal Venous Pressure] : normal venous pressure [No Carotid Bruit] : no carotid bruit [Normal S1, S2] : normal S1, S2 [No Murmur] : no murmur [No Rub] : no rub [No Gallop] : no gallop [Clear Lung Fields] : clear lung fields [Good Air Entry] : good air entry [No Respiratory Distress] : no respiratory distress  [Normal Gait] : normal gait [No Edema] : no edema [No Rash] : no rash [Moves all extremities] : moves all extremities [Normal Speech] : normal speech [Alert and Oriented] : alert and oriented [de-identified] : obese

## 2024-03-18 NOTE — DISCUSSION/SUMMARY
[FreeTextEntry1] : LUPE WANG is a 58 year old F   He has been less active in the recent months.  Mild dyspnea on exertion.  No exertional chest pain.  Evidence of nonobstructive CAD.  Check stress echocardiogram.  Hypercholesteremia.  Was on 5 mg of rosuvastatin.  LDL was not on target.  Statins were increased by Dr. Jaramillo.  Subsequent lipid profile is not available for review: We will obtain a copy of it.  target LDL less than 80.   Strict dietary changes were also discussed with reduction of saturated fat intake  Ct Aquilino score 31- incidental findings on CT discussed with copy given to discuss with her PCP.   Overweight: Exercise program and dietary changes were both recommended the. Dietary and lifestyle changes to reduce weight including exercise program, reduction of total calories and reduction of carbs was discussed.   Current risk factors including family history, prediabetes, overweight, hypercholesterolemia, labile blood pressures:   Sleep apnea: Continue CPAP machine  Prediabetes: Dietary changes, weight loss and exercise was again recommended.  Target A1c less than 5.7  Thank you for this referral and allowing me to participate in the care of this patient.  If I can be of any further help or  if you have any questions, please do not hesitate to contact me   Sincerely,  Willian Cowan MD, FACC, MILLICENT

## 2024-05-03 ENCOUNTER — APPOINTMENT (OUTPATIENT)
Dept: CARDIOLOGY | Facility: CLINIC | Age: 59
End: 2024-05-03

## 2024-05-18 ENCOUNTER — RX RENEWAL (OUTPATIENT)
Age: 59
End: 2024-05-18

## 2024-05-18 RX ORDER — ROSUVASTATIN CALCIUM 10 MG/1
10 TABLET, FILM COATED ORAL
Qty: 90 | Refills: 0 | Status: ACTIVE | COMMUNITY
Start: 2024-05-18 | End: 1900-01-01

## 2024-07-05 ENCOUNTER — APPOINTMENT (OUTPATIENT)
Dept: MAMMOGRAPHY | Facility: CLINIC | Age: 59
End: 2024-07-05
Payer: COMMERCIAL

## 2024-07-05 PROCEDURE — 77063 BREAST TOMOSYNTHESIS BI: CPT

## 2024-07-05 PROCEDURE — 77067 SCR MAMMO BI INCL CAD: CPT

## 2024-07-16 ENCOUNTER — APPOINTMENT (OUTPATIENT)
Dept: OBGYN | Facility: CLINIC | Age: 59
End: 2024-07-16

## 2024-07-24 ENCOUNTER — APPOINTMENT (OUTPATIENT)
Dept: OBGYN | Facility: CLINIC | Age: 59
End: 2024-07-24
Payer: COMMERCIAL

## 2024-07-24 VITALS
WEIGHT: 245 LBS | BODY MASS INDEX: 38.45 KG/M2 | SYSTOLIC BLOOD PRESSURE: 130 MMHG | DIASTOLIC BLOOD PRESSURE: 80 MMHG | HEIGHT: 67 IN

## 2024-07-24 DIAGNOSIS — Z86.39 PERSONAL HISTORY OF OTHER ENDOCRINE, NUTRITIONAL AND METABOLIC DISEASE: ICD-10-CM

## 2024-07-24 DIAGNOSIS — Z80.0 FAMILY HISTORY OF MALIGNANT NEOPLASM OF DIGESTIVE ORGANS: ICD-10-CM

## 2024-07-24 DIAGNOSIS — Z01.419 ENCOUNTER FOR GYNECOLOGICAL EXAMINATION (GENERAL) (ROUTINE) W/OUT ABNORMAL FINDINGS: ICD-10-CM

## 2024-07-24 DIAGNOSIS — Z87.891 PERSONAL HISTORY OF NICOTINE DEPENDENCE: ICD-10-CM

## 2024-07-24 PROCEDURE — 99396 PREV VISIT EST AGE 40-64: CPT

## 2024-07-24 PROCEDURE — 82270 OCCULT BLOOD FECES: CPT

## 2024-07-24 NOTE — PHYSICAL EXAM
[Appropriately responsive] : appropriately responsive [Alert] : alert [No Acute Distress] : no acute distress [Soft] : soft [Non-tender] : non-tender [Non-distended] : non-distended [No HSM] : No HSM [No Lesions] : no lesions [No Mass] : no mass [Oriented x3] : oriented x3 [Examination Of The Breasts] : a normal appearance [No Masses] : no breast masses were palpable [Labia Majora] : normal [Labia Minora] : normal [Normal] : normal [Uterine Adnexae] : normal [No Tenderness] : no tenderness [Nl Sphincter Tone] : normal sphincter tone [FreeTextEntry9] : guaiac neg

## 2024-07-24 NOTE — HISTORY OF PRESENT ILLNESS
[Y] : Positive pregnancy history [Currently Active] : currently active [Men] : men [No] : No [Mammogramdate] : 07/24 [TextBox_19] : [place] [PapSmeardate] : 03/23 [ColonoscopyDate] : 04/25/25 [LMPDate] :  [PGHxTotal] : 2 [[facility] 2 [FreeTextEntry1] : 2  SECTION

## 2024-07-30 LAB — CYTOLOGY CVX/VAG DOC THIN PREP: ABNORMAL

## 2024-08-06 ENCOUNTER — APPOINTMENT (OUTPATIENT)
Dept: CARDIOLOGY | Facility: CLINIC | Age: 59
End: 2024-08-06

## 2024-09-09 ENCOUNTER — APPOINTMENT (OUTPATIENT)
Dept: CARDIOLOGY | Facility: CLINIC | Age: 59
End: 2024-09-09
Payer: COMMERCIAL

## 2024-09-09 PROCEDURE — 93306 TTE W/DOPPLER COMPLETE: CPT

## 2024-10-03 ENCOUNTER — APPOINTMENT (OUTPATIENT)
Dept: CARDIOLOGY | Facility: CLINIC | Age: 59
End: 2024-10-03
Payer: COMMERCIAL

## 2024-10-03 PROCEDURE — 78452 HT MUSCLE IMAGE SPECT MULT: CPT

## 2024-10-03 PROCEDURE — A9502: CPT | Mod: JZ

## 2024-10-03 PROCEDURE — 93015 CV STRESS TEST SUPVJ I&R: CPT

## 2024-10-08 LAB — HBA1C MFR BLD HPLC: 6.1

## 2024-10-14 ENCOUNTER — APPOINTMENT (OUTPATIENT)
Dept: CARDIOLOGY | Facility: CLINIC | Age: 59
End: 2024-10-14
Payer: COMMERCIAL

## 2024-10-14 VITALS
DIASTOLIC BLOOD PRESSURE: 68 MMHG | RESPIRATION RATE: 14 BRPM | SYSTOLIC BLOOD PRESSURE: 126 MMHG | HEART RATE: 70 BPM | WEIGHT: 249 LBS | OXYGEN SATURATION: 98 % | HEIGHT: 67 IN | BODY MASS INDEX: 39.08 KG/M2

## 2024-10-14 DIAGNOSIS — I25.10 ATHEROSCLEROTIC HEART DISEASE OF NATIVE CORONARY ARTERY W/OUT ANGINA PECTORIS: ICD-10-CM

## 2024-10-14 DIAGNOSIS — K22.70 BARRETT'S ESOPHAGUS W/OUT DYSPLASIA: ICD-10-CM

## 2024-10-14 DIAGNOSIS — E66.9 OBESITY, UNSPECIFIED: ICD-10-CM

## 2024-10-14 DIAGNOSIS — G47.33 OBSTRUCTIVE SLEEP APNEA (ADULT) (PEDIATRIC): ICD-10-CM

## 2024-10-14 DIAGNOSIS — E78.00 PURE HYPERCHOLESTEROLEMIA, UNSPECIFIED: ICD-10-CM

## 2024-10-14 DIAGNOSIS — R73.03 PREDIABETES.: ICD-10-CM

## 2024-10-14 DIAGNOSIS — R03.0 ELEVATED BLOOD-PRESSURE READING, W/OUT DIAGNOSIS OF HYPERTENSION: ICD-10-CM

## 2024-10-14 DIAGNOSIS — E03.9 HYPOTHYROIDISM, UNSPECIFIED: ICD-10-CM

## 2024-10-14 PROCEDURE — 99204 OFFICE O/P NEW MOD 45 MIN: CPT

## 2024-10-14 PROCEDURE — G2211 COMPLEX E/M VISIT ADD ON: CPT | Mod: NC

## 2024-10-14 PROCEDURE — 99214 OFFICE O/P EST MOD 30 MIN: CPT

## 2024-10-14 RX ORDER — OXYMETAZOLINE HYDROCHLORIDE 0 G/ML
400 SPRAY NASAL
Refills: 0 | Status: ACTIVE | COMMUNITY

## 2024-10-14 RX ORDER — ASCORBIC ACID 125 MG
100 TABLET,CHEWABLE ORAL
Refills: 0 | Status: ACTIVE | COMMUNITY

## 2025-05-05 ENCOUNTER — APPOINTMENT (OUTPATIENT)
Dept: CARDIOLOGY | Facility: CLINIC | Age: 60
End: 2025-05-05
Payer: COMMERCIAL

## 2025-05-05 VITALS
WEIGHT: 249 LBS | OXYGEN SATURATION: 98 % | DIASTOLIC BLOOD PRESSURE: 72 MMHG | HEART RATE: 73 BPM | RESPIRATION RATE: 14 BRPM | HEIGHT: 67 IN | BODY MASS INDEX: 39.08 KG/M2 | SYSTOLIC BLOOD PRESSURE: 110 MMHG

## 2025-05-05 DIAGNOSIS — R73.03 PREDIABETES.: ICD-10-CM

## 2025-05-05 DIAGNOSIS — E78.00 PURE HYPERCHOLESTEROLEMIA, UNSPECIFIED: ICD-10-CM

## 2025-05-05 DIAGNOSIS — K22.70 BARRETT'S ESOPHAGUS W/OUT DYSPLASIA: ICD-10-CM

## 2025-05-05 DIAGNOSIS — R94.39 ABNORMAL RESULT OF OTHER CARDIOVASCULAR FUNCTION STUDY: ICD-10-CM

## 2025-05-05 DIAGNOSIS — E03.9 HYPOTHYROIDISM, UNSPECIFIED: ICD-10-CM

## 2025-05-05 DIAGNOSIS — I25.10 ATHEROSCLEROTIC HEART DISEASE OF NATIVE CORONARY ARTERY W/OUT ANGINA PECTORIS: ICD-10-CM

## 2025-05-05 DIAGNOSIS — G47.33 OBSTRUCTIVE SLEEP APNEA (ADULT) (PEDIATRIC): ICD-10-CM

## 2025-05-05 DIAGNOSIS — R03.0 ELEVATED BLOOD-PRESSURE READING, W/OUT DIAGNOSIS OF HYPERTENSION: ICD-10-CM

## 2025-05-05 DIAGNOSIS — E66.9 OBESITY, UNSPECIFIED: ICD-10-CM

## 2025-05-05 PROCEDURE — 99214 OFFICE O/P EST MOD 30 MIN: CPT

## 2025-05-05 PROCEDURE — G2211 COMPLEX E/M VISIT ADD ON: CPT | Mod: NC

## 2025-05-05 PROCEDURE — 93000 ELECTROCARDIOGRAM COMPLETE: CPT

## 2025-07-07 ENCOUNTER — APPOINTMENT (OUTPATIENT)
Dept: MAMMOGRAPHY | Facility: CLINIC | Age: 60
End: 2025-07-07
Payer: COMMERCIAL

## 2025-07-07 ENCOUNTER — APPOINTMENT (OUTPATIENT)
Dept: NEUROSURGERY | Facility: CLINIC | Age: 60
End: 2025-07-07
Payer: COMMERCIAL

## 2025-07-07 VITALS
DIASTOLIC BLOOD PRESSURE: 79 MMHG | HEART RATE: 58 BPM | WEIGHT: 239 LBS | TEMPERATURE: 97.2 F | BODY MASS INDEX: 37.51 KG/M2 | OXYGEN SATURATION: 8 % | SYSTOLIC BLOOD PRESSURE: 124 MMHG | HEIGHT: 67 IN

## 2025-07-07 PROBLEM — G93.0 ARACHNOID CYST: Status: ACTIVE | Noted: 2025-07-07

## 2025-07-07 PROCEDURE — 77067 SCR MAMMO BI INCL CAD: CPT

## 2025-07-07 PROCEDURE — 77063 BREAST TOMOSYNTHESIS BI: CPT

## 2025-07-07 PROCEDURE — 99203 OFFICE O/P NEW LOW 30 MIN: CPT

## 2025-07-28 ENCOUNTER — APPOINTMENT (OUTPATIENT)
Dept: OBGYN | Facility: CLINIC | Age: 60
End: 2025-07-28
Payer: COMMERCIAL

## 2025-07-28 ENCOUNTER — APPOINTMENT (OUTPATIENT)
Dept: NEUROSURGERY | Facility: CLINIC | Age: 60
End: 2025-07-28
Payer: COMMERCIAL

## 2025-07-28 VITALS
WEIGHT: 239 LBS | BODY MASS INDEX: 37.51 KG/M2 | SYSTOLIC BLOOD PRESSURE: 130 MMHG | DIASTOLIC BLOOD PRESSURE: 79 MMHG | HEIGHT: 67 IN

## 2025-07-28 VITALS
BODY MASS INDEX: 37.2 KG/M2 | DIASTOLIC BLOOD PRESSURE: 75 MMHG | TEMPERATURE: 97 F | WEIGHT: 237 LBS | OXYGEN SATURATION: 96 % | HEIGHT: 67 IN | SYSTOLIC BLOOD PRESSURE: 115 MMHG | HEART RATE: 71 BPM

## 2025-07-28 DIAGNOSIS — Z01.411 ENCOUNTER FOR GYNECOLOGICAL EXAMINATION (GENERAL) (ROUTINE) WITH ABNORMAL FINDINGS: ICD-10-CM

## 2025-07-28 PROCEDURE — 99213 OFFICE O/P EST LOW 20 MIN: CPT

## 2025-07-28 PROCEDURE — 99396 PREV VISIT EST AGE 40-64: CPT

## 2025-07-30 LAB — HPV HIGH+LOW RISK DNA PNL CVX: NOT DETECTED

## 2025-07-31 LAB — CYTOLOGY CVX/VAG DOC THIN PREP: NORMAL
